# Patient Record
Sex: MALE | Race: BLACK OR AFRICAN AMERICAN | NOT HISPANIC OR LATINO | Employment: UNEMPLOYED | URBAN - METROPOLITAN AREA
[De-identification: names, ages, dates, MRNs, and addresses within clinical notes are randomized per-mention and may not be internally consistent; named-entity substitution may affect disease eponyms.]

---

## 2022-10-05 ENCOUNTER — HOSPITAL ENCOUNTER (EMERGENCY)
Facility: CLINIC | Age: 37
Discharge: HOME | End: 2022-10-05
Attending: EMERGENCY MEDICINE | Admitting: EMERGENCY MEDICINE
Payer: COMMERCIAL

## 2022-10-05 VITALS
SYSTOLIC BLOOD PRESSURE: 132 MMHG | HEIGHT: 68 IN | HEART RATE: 71 BPM | DIASTOLIC BLOOD PRESSURE: 81 MMHG | BODY MASS INDEX: 27.74 KG/M2 | RESPIRATION RATE: 16 BRPM | OXYGEN SATURATION: 96 % | WEIGHT: 183 LBS | TEMPERATURE: 98.7 F

## 2022-10-05 DIAGNOSIS — F19.10 POLYSUBSTANCE ABUSE (H): ICD-10-CM

## 2022-10-05 LAB
ALCOHOL BREATH TEST: 0 (ref 0–0.01)
AMPHETAMINES UR QL SCN: ABNORMAL
BARBITURATES UR QL: ABNORMAL
BENZODIAZ UR QL: ABNORMAL
CANNABINOIDS UR QL SCN: ABNORMAL
COCAINE UR QL: ABNORMAL
OPIATES UR QL SCN: ABNORMAL

## 2022-10-05 PROCEDURE — 99283 EMERGENCY DEPT VISIT LOW MDM: CPT

## 2022-10-05 PROCEDURE — 80307 DRUG TEST PRSMV CHEM ANLYZR: CPT | Performed by: EMERGENCY MEDICINE

## 2022-10-05 PROCEDURE — 99282 EMERGENCY DEPT VISIT SF MDM: CPT | Performed by: EMERGENCY MEDICINE

## 2022-10-05 PROCEDURE — 82075 ASSAY OF BREATH ETHANOL: CPT

## 2022-10-05 RX ORDER — OLANZAPINE 5 MG/1
5 TABLET ORAL
COMMUNITY
Start: 2022-10-05

## 2022-10-05 ASSESSMENT — ENCOUNTER SYMPTOMS
BACK PAIN: 0
DYSPHORIC MOOD: 0
HEADACHES: 0
FEVER: 0
EYE REDNESS: 0
SLEEP DISTURBANCE: 0
ABDOMINAL PAIN: 0
COUGH: 0
SHORTNESS OF BREATH: 0
NECK PAIN: 0
DIFFICULTY URINATING: 0
NAUSEA: 0
WEAKNESS: 0
VOMITING: 0
SORE THROAT: 0

## 2022-10-06 NOTE — ED PROVIDER NOTES
ED Provider Note  [facility]      History     Chief Complaint   Patient presents with     Addiction Problem     HPI  Dolores Serrato is a 37 year old male who presents to the emergency department for evaluation of substance use.  The patient states that he has been smoking methamphetamines, marijuana, and fentanyl.  Patient states that for the past 2 weeks, he has been using methamphetamines and marijuana heavily.  For the past 4 days, he also tried smoking fentanyl.  He denies any withdrawal symptoms when he does not use fentanyl.  The patient states that he occasionally drinks alcohol but does not drink alcohol on a daily basis.  He denies any tremulousness or history of alcohol withdrawal seizures or DTs.  Patient denies any fall or injury.  He denies any recent illness or medical concerns.  He was recently prescribed Zyprexa.  He denies any depression or suicidal ideation.    Past Medical History  Past Medical History:   Diagnosis Date     Bipolar disorder (H)      Depressive disorder      Explosive personality disorder (H)      PTSD (post-traumatic stress disorder)      Schizophrenia (H)      Substance abuse (H)      History reviewed. No pertinent surgical history.  OLANZapine (ZYPREXA) 5 MG tablet      No Known Allergies  Family History  History reviewed. No pertinent family history.  Social History   Social History     Tobacco Use     Smoking status: Current Every Day Smoker     Packs/day: 0.50     Types: Cigarettes, Vaping Device     Smokeless tobacco: Never Used     Tobacco comment: Vapes   Substance Use Topics     Alcohol use: Yes     Comment: Last drank 3 days ago.  Binge drinks     Drug use: Yes     Types: Marijuana, Methamphetamines, Opiates     Comment: Last used yesterday.  Uses 1 pill fentanyl per day.  Meth last 6 weeks regularly      Past medical history, past surgical history, medications, allergies, family history, and social history were reviewed with the patient.  "No additional pertinent items.       Review of Systems   Constitutional: Negative for fever.   HENT: Negative for congestion and sore throat.    Eyes: Negative for redness.   Respiratory: Negative for cough and shortness of breath.    Cardiovascular: Negative for chest pain.   Gastrointestinal: Negative for abdominal pain, nausea and vomiting.   Genitourinary: Negative for difficulty urinating.   Musculoskeletal: Negative for back pain and neck pain.   Skin: Negative for rash.   Neurological: Negative for weakness and headaches.   Psychiatric/Behavioral: Negative for dysphoric mood, sleep disturbance and suicidal ideas.   All other systems reviewed and are negative.    A complete review of systems was performed with pertinent positives and negatives noted in the HPI, and all other systems negative.    Physical Exam   BP: 134/74  Pulse: 69  Temp: 98.7  F (37.1  C)  Resp: 16  Height: 172.7 cm (5' 8\")  Weight: 83 kg (183 lb)  SpO2: 96 %  Physical Exam  Vitals and nursing note reviewed.   Constitutional:       General: He is not in acute distress.     Appearance: He is not diaphoretic.   HENT:      Head: Normocephalic and atraumatic.   Eyes:      General: No scleral icterus.     Pupils: Pupils are equal, round, and reactive to light.   Cardiovascular:      Rate and Rhythm: Normal rate and regular rhythm.      Pulses: Normal pulses.      Heart sounds: Normal heart sounds.   Pulmonary:      Effort: Pulmonary effort is normal. No respiratory distress.      Breath sounds: Normal breath sounds.   Abdominal:      General: Bowel sounds are normal.      Palpations: Abdomen is soft.      Tenderness: There is no abdominal tenderness.   Musculoskeletal:         General: No tenderness. Normal range of motion.   Skin:     General: Skin is warm and dry.      Findings: No rash.   Neurological:      General: No focal deficit present.      Mental Status: He is alert.      Cranial Nerves: No cranial nerve deficit.      Motor: No " weakness.      Coordination: Coordination normal.      Gait: Gait normal.   Psychiatric:         Mood and Affect: Mood normal.         Behavior: Behavior normal.         ED Course      Procedures       The medical record was reviewed and interpreted.  Current labs reviewed and interpreted.              Results for orders placed or performed during the hospital encounter of 10/05/22   Drug abuse screen 1 urine (ED)     Status: Abnormal   Result Value Ref Range    Amphetamines Urine Screen Negative Screen Negative    Barbiturates Urine Screen Negative Screen Negative    Benzodiazepines Urine Screen Negative Screen Negative    Cannabinoids Urine Screen Positive (A) Screen Negative    Cocaine Urine Screen Negative Screen Negative    Opiates Urine Screen Negative Screen Negative   Alcohol breath test POCT     Status: Normal   Result Value Ref Range    Alcohol Breath Test 0.000 0.00 - 0.01   Urine Drugs of Abuse Screen     Status: Abnormal    Narrative    The following orders were created for panel order Urine Drugs of Abuse Screen.  Procedure                               Abnormality         Status                     ---------                               -----------         ------                     Drug abuse screen 1 urin...[[id number]]  Abnormal            Final result                 Please view results for these tests on the individual orders.     Medications - No data to display     Assessments & Plan (with Medical Decision Making)   37 year old male to the emergency department seeking assessment for polysubstance use including primarily methamphetamines and marijuana but also occasional fentanyl.  He smokes all of his drugs.  He denies any IV drug use.  He does not have any medical concerns.  The patient was provided resources for rule 25 assessment for chemical dependency evaluation and treatment.    I have reviewed the nursing notes. I have reviewed the findings, diagnosis, plan and need for follow up with  the patient.    New Prescriptions    No medications on file       Final diagnoses:   Polysubstance abuse (H)     Chart documentation was completed with Dragon voice-recognition software. Even though reviewed, this chart may still contain some grammatical, spelling, and word errors.     --  Anthony Tobar Md  [facility] EMERGENCY DEPARTMENT  10/5/2022     Anthony Tobar MD  10/05/22 2152

## 2022-10-06 NOTE — DISCHARGE INSTRUCTIONS
Follow-up for outpatient chemical dependency assessment and treatment.    Return to the emergency department if any concerns.

## 2022-10-06 NOTE — ED TRIAGE NOTES
Pt. here seeking detox from alcohol, meth, marijuana,and Fentanyl.  Last used alcohol 3 days ago.  Last used meth, marijuana and fentanyl yesterday.  No hx.  withdrawal seizures.  No SI or HI.      Triage Assessment     Row Name 10/05/22 2052       Triage Assessment (Adult)    Airway WDL WDL       Respiratory WDL    Respiratory WDL WDL       Skin Circulation/Temperature WDL    Skin Circulation/Temperature WDL WDL       Cardiac WDL    Cardiac WDL WDL       Peripheral/Neurovascular WDL    Peripheral Neurovascular WDL WDL       Cognitive/Neuro/Behavioral WDL    Cognitive/Neuro/Behavioral WDL WDL

## 2023-03-15 ENCOUNTER — HOSPITAL ENCOUNTER (OUTPATIENT)
Facility: CLINIC | Age: 38
Setting detail: OBSERVATION
Discharge: NURSING, REHAB OR LONG-TERM CARE | End: 2023-03-16
Attending: EMERGENCY MEDICINE | Admitting: NURSE PRACTITIONER
Payer: COMMERCIAL

## 2023-03-15 VITALS
BODY MASS INDEX: 26.83 KG/M2 | WEIGHT: 177 LBS | HEART RATE: 82 BPM | HEIGHT: 68 IN | DIASTOLIC BLOOD PRESSURE: 103 MMHG | SYSTOLIC BLOOD PRESSURE: 159 MMHG | OXYGEN SATURATION: 96 % | RESPIRATION RATE: 16 BRPM | TEMPERATURE: 98.6 F

## 2023-03-15 DIAGNOSIS — R45.850 HOMICIDAL IDEATION: ICD-10-CM

## 2023-03-15 DIAGNOSIS — F20.9 SCHIZOPHRENIA, UNSPECIFIED TYPE (H): ICD-10-CM

## 2023-03-15 DIAGNOSIS — F15.21 METHAMPHETAMINE USE DISORDER, MODERATE, IN EARLY REMISSION (H): ICD-10-CM

## 2023-03-15 DIAGNOSIS — R45.851 SUICIDAL IDEATION: ICD-10-CM

## 2023-03-15 DIAGNOSIS — F10.21 ALCOHOL USE DISORDER, MODERATE, IN EARLY REMISSION (H): ICD-10-CM

## 2023-03-15 DIAGNOSIS — F12.21: ICD-10-CM

## 2023-03-15 LAB
FLUAV RNA SPEC QL NAA+PROBE: NEGATIVE
FLUBV RNA RESP QL NAA+PROBE: NEGATIVE
RSV RNA SPEC NAA+PROBE: NEGATIVE
SARS-COV-2 RNA RESP QL NAA+PROBE: NEGATIVE

## 2023-03-15 PROCEDURE — 90791 PSYCH DIAGNOSTIC EVALUATION: CPT

## 2023-03-15 PROCEDURE — 99285 EMERGENCY DEPT VISIT HI MDM: CPT | Mod: 25

## 2023-03-15 PROCEDURE — 250N000013 HC RX MED GY IP 250 OP 250 PS 637: Performed by: NURSE PRACTITIONER

## 2023-03-15 PROCEDURE — G0378 HOSPITAL OBSERVATION PER HR: HCPCS

## 2023-03-15 PROCEDURE — 87637 SARSCOV2&INF A&B&RSV AMP PRB: CPT | Performed by: EMERGENCY MEDICINE

## 2023-03-15 PROCEDURE — C9803 HOPD COVID-19 SPEC COLLECT: HCPCS

## 2023-03-15 RX ORDER — ACETAMINOPHEN 500 MG
1000 TABLET ORAL EVERY 8 HOURS PRN
Status: DISCONTINUED | OUTPATIENT
Start: 2023-03-15 | End: 2023-03-16 | Stop reason: HOSPADM

## 2023-03-15 RX ORDER — DIPHENHYDRAMINE HCL 25 MG
25-50 CAPSULE ORAL
Status: DISCONTINUED | OUTPATIENT
Start: 2023-03-15 | End: 2023-03-16 | Stop reason: HOSPADM

## 2023-03-15 RX ORDER — OLANZAPINE 5 MG/1
5-10 TABLET ORAL EVERY 6 HOURS PRN
Status: DISCONTINUED | OUTPATIENT
Start: 2023-03-15 | End: 2023-03-16

## 2023-03-15 RX ORDER — QUETIAPINE FUMARATE 100 MG/1
100 TABLET, FILM COATED ORAL AT BEDTIME
Status: DISCONTINUED | OUTPATIENT
Start: 2023-03-15 | End: 2023-03-16 | Stop reason: HOSPADM

## 2023-03-15 RX ADMIN — DIPHENHYDRAMINE HYDROCHLORIDE 50 MG: 25 CAPSULE ORAL at 22:56

## 2023-03-15 RX ADMIN — QUETIAPINE FUMARATE 100 MG: 100 TABLET ORAL at 22:56

## 2023-03-15 RX ADMIN — ACETAMINOPHEN 1000 MG: 500 TABLET ORAL at 20:03

## 2023-03-15 ASSESSMENT — COLUMBIA-SUICIDE SEVERITY RATING SCALE - C-SSRS
2. HAVE YOU ACTUALLY HAD ANY THOUGHTS OF KILLING YOURSELF?: YES
TOTAL  NUMBER OF ACTUAL ATTEMPTS LIFETIME: 1
2. HAVE YOU ACTUALLY HAD ANY THOUGHTS OF KILLING YOURSELF?: YES
1. IN THE PAST MONTH, HAVE YOU WISHED YOU WERE DEAD OR WISHED YOU COULD GO TO SLEEP AND NOT WAKE UP?: YES
5. HAVE YOU STARTED TO WORK OUT OR WORKED OUT THE DETAILS OF HOW TO KILL YOURSELF? DO YOU INTEND TO CARRY OUT THIS PLAN?: NO
ATTEMPT PAST THREE MONTHS: NO
ATTEMPT LIFETIME: YES
4. HAVE YOU HAD THESE THOUGHTS AND HAD SOME INTENTION OF ACTING ON THEM?: NO
5. HAVE YOU STARTED TO WORK OUT OR WORKED OUT THE DETAILS OF HOW TO KILL YOURSELF? DO YOU INTEND TO CARRY OUT THIS PLAN?: YES
3. HAVE YOU BEEN THINKING ABOUT HOW YOU MIGHT KILL YOURSELF?: YES
1. HAVE YOU WISHED YOU WERE DEAD OR WISHED YOU COULD GO TO SLEEP AND NOT WAKE UP?: YES
4. HAVE YOU HAD THESE THOUGHTS AND HAD SOME INTENTION OF ACTING ON THEM?: YES

## 2023-03-15 ASSESSMENT — ACTIVITIES OF DAILY LIVING (ADL)
ADLS_ACUITY_SCORE: 35

## 2023-03-15 NOTE — ED PROVIDER NOTES
"    History     Chief Complaint:  Psychiatric Evaluation    The history is provided by the patient.      Bijal Serrato is a 37 year old male with a history of schizophrenia, bipolar disorder, and PTSD who presents with psychiatric evaluation. There was a recent mistake made with his mediations, so he is now having suicidal and homicidal ideations. He does not have a specific plan for his suicidal ideations, but has been \"exploding\" at his roommate.     Independent Historian: None     Review of External Notes: None available    ROS:  Review of Systems   Psychiatric/Behavioral: Positive for suicidal ideas.        Positive for homicidal ideations   All other systems reviewed and are negative.    Allergies:  No Known Allergies     Medications:    Unspecified psychiatric medications     Past Medical History:    Schizophrenia   Bipolar disorder   PTSD     Social History:  PCP: No primary care provider on file.   The patient presents to the ED alone via private vehicle     Physical Exam     Patient Vitals for the past 24 hrs:   BP Temp Temp src Pulse Resp SpO2 Height Weight   03/15/23 1525 (!) 162/92 98.6  F (37  C) Temporal 88 16 97 % 1.727 m (5' 8\") 84.8 kg (187 lb)        Physical Exam  Constitutional: Alert, attentive, GCS 15   Eyes: EOM are normal, anicteric, conjugate gaze  CV: distal extremities warm, well perfused  Chest: Non-labored breathing on RA  Neurological: Alert, attentive, moving all extremities equally.   Skin: Skin is warm and dry.  Psychological: Passive suicidal and homicidal ideations.     Emergency Department Course   Laboratory:  Labs Ordered and Resulted from Time of ED Arrival to Time of ED Departure   INFLUENZA A/B, RSV, & SARS-COV2 PCR - Normal       Result Value    Influenza A PCR Negative      Influenza B PCR Negative      RSV PCR Negative      SARS CoV2 PCR Negative       Emergency Department Course & Assessments:  Interventions:  None     Independent Interpretation (X-rays, CTs, rhythm " strip):  None      Consultations/Discussion of Management or Tests:  None     Social Determinants of Health affecting care:  None      Assessments:  1530 I obtained history and examined the patient as noted above. [facility]    Disposition:  The patient was transferred to [facility].     Impression & Plan    Medical Decision Makin-year-old male past medical history seen for schizophrenia presenting from what sounds like recent placement of new [facility] for increased homicidal and suicidal ideation.  He has no specific plan or intent and was having more passive thoughts towards residents there.  He contracts for safety here, denies drugs or alcohol, COVID testing was negative and he was sent to [facility] for further evaluation.    Diagnosis:    ICD-10-CM    1. Suicidal ideation  R45.851       2. Homicidal ideation  R45.850       3. Schizophrenia, unspecified type (H)  F20.9          Bhavin Goins MD  [employer]  9:45 PM 03/15/23     Scribe Disclosure:  I, Preet Sun, am serving as a scribe at 3:38 PM on 3/15/2023 to document services personally performed by Bhavin Goins MD based on my observations and the provider's statements to me.    3/15/2023   Bhavin Goins MD Dunbar, John Forrest, MD  03/15/23 4191

## 2023-03-16 PROCEDURE — 99235 HOSP IP/OBS SAME DATE MOD 70: CPT | Performed by: PSYCHIATRY & NEUROLOGY

## 2023-03-16 PROCEDURE — G0378 HOSPITAL OBSERVATION PER HR: HCPCS

## 2023-03-16 RX ORDER — QUETIAPINE FUMARATE 100 MG/1
100 TABLET, FILM COATED ORAL AT BEDTIME
Qty: 14 TABLET | Refills: 0 | Status: SHIPPED | OUTPATIENT
Start: 2023-03-16

## 2023-03-16 RX ORDER — QUETIAPINE FUMARATE 25 MG/1
25 TABLET, FILM COATED ORAL 3 TIMES DAILY PRN
Status: DISCONTINUED | OUTPATIENT
Start: 2023-03-16 | End: 2023-03-16 | Stop reason: HOSPADM

## 2023-03-16 ASSESSMENT — ACTIVITIES OF DAILY LIVING (ADL)
ADLS_ACUITY_SCORE: 35

## 2023-03-16 ASSESSMENT — COLUMBIA-SUICIDE SEVERITY RATING SCALE - C-SSRS
6. HAVE YOU EVER DONE ANYTHING, STARTED TO DO ANYTHING, OR PREPARED TO DO ANYTHING TO END YOUR LIFE?: NO
5. HAVE YOU STARTED TO WORK OUT OR WORKED OUT THE DETAILS OF HOW TO KILL YOURSELF? DO YOU INTEND TO CARRY OUT THIS PLAN?: NO
ATTEMPT SINCE LAST CONTACT: NO
SUICIDE, SINCE LAST CONTACT: NO
1. SINCE LAST CONTACT, HAVE YOU WISHED YOU WERE DEAD OR WISHED YOU COULD GO TO SLEEP AND NOT WAKE UP?: YES
REASONS FOR IDEATION SINCE LAST CONTACT: COMPLETELY TO GET ATTENTION, REVENGE, OR A REACTION FROM OTHERS
2. HAVE YOU ACTUALLY HAD ANY THOUGHTS OF KILLING YOURSELF?: YES
TOTAL  NUMBER OF INTERRUPTED ATTEMPTS SINCE LAST CONTACT: NO
TOTAL  NUMBER OF ABORTED OR SELF INTERRUPTED ATTEMPTS SINCE LAST CONTACT: NO

## 2023-03-16 NOTE — ED NOTES
Patients is currently resting in sensory room C. He reports having some racing thoughts and endorsed SI /HI earlier at the treatment center. He said he feels helpless at this time since he is not sure about what will happen with his housing situation. He made an attempt to reach out to his  from the community and said hopes to get some support with his recovery. Patient was compliant with HS medications. Nursing will continue to monitor for safety.

## 2023-03-16 NOTE — CONSULTS
Diagnostic Evaluation Consultation  Crisis Assessment    Patient was assessed: In Person  Patient location: [facility]  Was a release of information signed: No      Referral Data and Chief Complaint  Dolores is a 37 year old, who uses he/him pronouns, and presents to the ED other: cab from [facility]; they discharged Pt and sent him to 'check himself into the hospital'. Patient is referred to the ED by community provider(s). Patient is presenting to the ED for the following concerns: SI, HI, psychosis, and medication issues.      Informed Consent and Assessment Methods     Patient is his own guardian. Writer met with patient and explained the crisis assessment process, including applicable information disclosures and limits to confidentiality, assessed understanding of the process, and obtained consent to proceed with the assessment. Patient was observed to be able to participate in the assessment as evidenced by verbal engagement. Assessment methods included conducting a formal interview with patient, review of medical records, collaboration with medical staff, and obtaining relevant collateral information from family and community providers when available..     Over the course of this crisis assessment provided reassurance, offered validation and provided psychoeducation. Patient's response to interventions was moderate; Pt visibly upset and agitated by situation that led to coming to ED; Pt was also visibly agitated as he continued to endorse struggling with active psychosis, HI, and SI, but denies intent to harm self or others.     Summary of Patient Situation  Pt was sent to the emergency department today by staff at [facility], via cab, and told they were discharging him and he was to 'check himself into the hospital,' and disclose his HI, SI, and psychosis. Pt describes significant difficulties since arriving at [facility] on 3/8/23, noting the first night he arrived he was not able  to take his medications because they were left at his previous treatment program ([facility]). Pt reports significant mental health and chemical health history, including past diagnoses of Schizophrenia, Bipolar, PTSD, and Intermittent Explosive Disorder. Review of medical records also reveals past diagnosis of Antisocial Personality Disorder, and Schizoaffective Disorder. Pt is currently 36 days sober, which is the longest period of sobriety he's obtained. Pt states he has struggled with methamphetamine, fentanyl, marijuana, and occasionally alcohol use.    Pt states since the first night at [facility], following his inability to have his medications, he has been struggling with being unable to sleep, and experiencing significant increase in AH, VH, paranoia, and psychosis. Pt reports he has a yeast infection that would resolve, and he believes some of psychotropic medications are contributing, and therefor is now hesitant to restart his medications other than Seroquel. Pt does display moderate insight in recognizing some of his paranoid beliefs and AH/VH are symptoms of psychosis, but he is having significant difficulty managing these symptoms, resulting in increased agitation, aggression, and behavioral outbursts. Pt notes he did threaten his roommate, noting he believed the roommate was snoring on purpose 'to fuck with my mental health;' Pt also states the roommate would turn off the heater, so he would have more difficulty sleeping. Pt confronted his roommate by attempting to flip his bed, while roommate was on it, and made threats towards him.     Pt states repeatedly he has been 'losing my fucking mind' and expressed frustration as he hasn't been able to get help while at [facility]. Pt shares multiple examples of AH and VH throughout assessment, including hearing babies screaming coming from his roommates belly; seeing beasts in the wilderness; hearing people calling his name that weren't in the room. Pt  also describes listening to 'cerebral music on Youtube that balances the left and bottom cortex; I'm doing research on it and it helps me reach my euphoria.' Pt also describes having conversations with 'my precious, and my precious has been sad, asking me what are they doing to us;' Pt is referring to his belief his psychotropic medications are causing the yeast infection and impotence.    Brief Psychosocial History  Pt has most recently been living at [facility] in [place]; he was transferred to this program on 3/8/23 from [facility]. Pt is currently working on maintaining sobriety; he is not currently working. Pt denies  status; Pt denies any current legal issues. Pt states he 'has no one' and does not share about family, other than a cousin he no longer trusts. Pt states he is part [place], but declines any efforts to help him connect with [facility] resources, stating 'no, I didn't like how they tried to make me feel;' Pt does not elaborate. Pt does share he felt connected to a Homero from a previous program ([facility]).    Significant Clinical History  Pt has significant mental health and substance use history. Pt reports his MH diagnoses include Schizophrenia, Bipolar, PTSD, and Intermittent Explosive Disorder; EMR also reveals past diagnosis of Antisocial Personality Disorder, as well as Schizoaffective Disorder. Pt reports he started using drugs around 15, and his current period of sobriety is the longest period he has had. Pt historically has struggled with abusing methamphetamine, fentanyl, marijuana, and alcohol.    Review of medical records reveals Pt has multiple ED visits for mental health and substance abuse, including 5 ED (10/5, 10/4, 9/25, 6/25) visits and one inpatient stay (5/17-5/23/22) in 2022. Pt was unable to provide more specific details about his mental health and substance abuse treatment history as he started becoming tearful and agitated during the assessment.       Collateral Information  Clinician attempted to contact staff at [facility] in [place] ([phone]), but there was no answer. Clinician left generic message and requested a call back to discuss the situation.    Risk Assessment  [place] Suicide Severity Rating Scale Full Clinical Version: 3/15/23  Suicidal Ideation  1. Wish to be Dead (Lifetime): Yes  Wish to be Dead Description (Lifetime): Pt states his baseline is constant SI, passive, and denies any current/recent active SI  1. Wish to be Dead (Past 1 Month): Yes  Wish to be Dead Description (Past 1 Month): Pt states his baseline is constant SI, passive, and denies any current/recent active SI  2. Non-Specific Active Suicidal Thoughts (Lifetime): Yes  Non-Specific Active Suicidal Thought Description (Lifetime): Pt states his baseline is constant SI, passive, and denies any current/recent active SI  2. Non-Specific Active Suicidal Thoughts (Past 1 Month): Yes  Non-Specific Active Suicidal Thought Description (Past 1 Month): Pt states his baseline is constant SI, passive, and denies any current/recent active SI  3. Active Suicidal Ideation with any Methods (Not Plan) Without Intent to Act (Lifetime): Yes  3. Active Suicidal Ideation with any Methods (Not Plan) Without Intent to Act (Past 1 Month): No  4. Active Suicidal Ideation with Some Intent to Act, Without Specific Plan (Lifetime): Yes  4. Active Suicidal Ideation with Some Intent to Act, Without Specific Plan (Past 1 Month): No  5. Active Suicidal Ideation with Specific Plan and Intent (Lifetime): Yes  Active Suicidal Ideation with Specific Plan and Intent Description (Lifetime): Pt reports attempted hanging about 1 year ago  5. Active Suicidal Ideation with Specific Plan and Intent (Past 1 Month): No     Suicidal Behavior  Actual Attempt (Lifetime): Yes  Total Number of Actual Attempts (Lifetime): 1 - reports attempted hanging in 2022  Actual Attempt (Past 3 Months): No  C-SSRS Risk  (Lifetime/Recent)  Calculated C-SSRS Risk Score (Lifetime/Recent): Moderate Risk       Validity of evaluation is impacted by presenting factors during interview: while Pt endorsed a baseline of SI and HI, he was unable to provide specific details to answer the C-SSRS fully.   Comments regarding subjective versus objective responses to F[place]ardo tool: NA  Environmental or Psychosocial Events: loss of status/respect/rank, impulsivity/recklessness, unemployment/underemployment, unstable housing, homelessness, other life stressors, neither working nor attending school and other: Pt unexpectedly discharged from CD treatment program today without resources  Chronic Risk Factors: history of suicide attempts (at least one), history of psychiatric hospitalization, chronic and ongoing sleep difficulties and history of abuse or neglect   Warning Signs: seeking access to means to hurt or kill self, hopelessness, rage, anger, seeking revenge, acting reckless or engaging in risky activities, feeling trapped, like there is no way out, anxiety, agitation, unable to sleep, sleeping all the time, dramatic changes in mood and engaging in self-destructive behavior  Protective Factors: other identified factors which may mitigate risk for suicide: Pt reports he is trying to 'heal' and work on his sobriety and   Interpretation of Risk Scoring, Risk Mitigation Interventions and Safety Plan:  Pt to remain at [facility] for observation; Pt moved into a sensory room to reduce potential agitation from peers and noises as he continues to struggle with poor sleep and paranoia.    Does the patient have thoughts of harming others? Yes; Pt endorses chronic, persistent HI and lengthily history of making threats to others. Pt notes this is his baseline and denies any current HI towards any specific person.      Is the patient engaging in sexually inappropriate behavior?  no        Current Substance Abuse     Is there recent substance abuse? no     Was a  urine drug screen or blood alcohol level obtained: No       Mental Status Exam   Affect: Dramatic   Appearance: Appropriate    Attention Span/Concentration: Attentive  Eye Contact: Intense and Variable   Fund of Knowledge: Appropriate    Language /Speech Content: Fluent   Language /Speech Volume: Normal    Language /Speech Rate/Productions: Normal    Recent Memory: Variable   Remote Memory: Variable   Mood: Anxious and Irritable    Orientation to Person: Yes    Orientation to Place: Yes   Orientation to Time of Day: Yes    Orientation to Date: Yes    Situation (Do they understand why they are here?): Answer: Pt was sent by his tx program, and did not understand why    Psychomotor Behavior: Agitated    Thought Content: Hallucinations, Homicidal, Paranoia and Suicidal   Thought Form: Intact      History of commitment: No       Medication  Psychotropic medications:   Current Facility-Administered Medications   Medication     acetaminophen (TYLENOL) tablet 1,000 mg     diphenhydrAMINE (BENADRYL) capsule 25-50 mg     OLANZapine (zyPREXA) tablet 5-10 mg     QUEtiapine (SEROquel) tablet 100 mg     No current outpatient medications on file.       Medication changes made in the last two weeks: Pt reports significant medication non-compliance since arriving at [facility] on 3/8/23       Current Care Team    Primary Care Provider: No  Psychiatrist: No  Therapist: No  : No; Pt does have connection Maria Antonia Navarro at [facility] ([phone])     CTSS or ARMHS: No  ACT Team: No  Other: No      Diagnosis    298.9 (F29)  Unspecified Schizophrenia Spectrum       Clinical Summary and Substantiation of Recommendations    Pt presents to ED after he was unexpectedly discharged from his CD treatment program - [facility] in [place]. Pt describes significant increase in psychotic symptoms, as well as increased aggression and threats towards others, as he's not been consistently taking his medications since  3/8/23. Pt is wanting support in stabilizing his medications, as well as being connected with new resources, including possible IRTS program.    Disposition    Recommended disposition: Other: OBS       Reviewed case and recommendations with attending provider. Attending Name: Matthew Brian CNP       Attending concurs with disposition: Yes       Patient and/or validated legal guardian concurs with disposition: Yes       Final disposition: Other: OBS.     Inpatient Details (if applicable):   Is patient admitted voluntarily: NA      Patient aware of potential for transfer if there is not appropriate placement? NA       Patient is willing to travel outside of the [facility] for placement? NA      Behavioral Intake Notified? NA     Outpatient Details (if applicable):   Aftercare plan and appointments placed in the AVS and provided to patient: No. Rationale: Will be provided at discharge    Was lethal means counseling provided as a part of aftercare planning? No;       Assessment Details    Patient interview started at: 817 and completed at: 900.     Total duration spent on the patient case in minutes: 1.75 hrs      CPT code(s) utilized: 53964 - Psychotherapy for Crisis - 60 (30-74*) min and 57760 - Psychotherapy for Crisis (Each additional 30 minutes) - 30 min        TAI CUNHA, MA, LMFT, Psychotherapist  [facility] - Triage & Transition Services  Callback: [phone]

## 2023-03-16 NOTE — ED PROVIDER NOTES
"Em[facility] Unit - Psychiatric Observation Discharge Summary  [facility] Emergency Department  Discharge Date: 3/16/2023  Admit date: 3/15/23    Bijal Serrato MRN: [id number]   Age: 37 year old YOB: 1985     Brief HPI & Initial ED Course     Chief Complaint   Patient presents with     Psychiatric Evaluation     HPI  Bijal Serrato is a 37 year old male with history notable for schizophrenia and substance use disorder in early remission. He is feeling much better after several hours of sleep during the day and some time with the . He is open to going to a crisis bed.  A bed was found at [facility].  He is no longer suicidal at this point.  He does admit to having chronic suicidal thoughts, but denies any right now.  He denies any hallucinations currently.          Physical Examination   BP: (!) 159/103  Pulse: 82  Temp: 98.6  F (37  C)  Resp: 16  Height: 172.7 cm (5' 8\")  Weight: 80.3 kg (177 lb)  SpO2: 96 %    Physical Exam  General: Appears stated age.   Neuro: Alert and fully oriented. Extremities appear to demonstrate normal strength on visual inspection.   Integumentary/Skin: no rash visualized, normal color    Psychiatric Examination   Appearance: awake, alert, adequately groomed and appeared as age stated  Attitude:  cooperative  Eye Contact:  good  Mood:  better  Affect:  mood congruent  Speech:  clear, coherent  Psychomotor Behavior:  no evidence of tardive dyskinesia, dystonia, or tics  Thought Process:  logical, linear and goal oriented  Associations:  no loose associations  Thought Content:  no evidence of suicidal ideation or homicidal ideation and no evidence of psychotic thought  Insight:  fair  Judgement:  fair  Oriented to:  time, person, and place  Attention Span and Concentration:  intact  Recent and Remote Memory:  intact  Language: able to name/identify objects without impairment  Fund of Knowledge: intact with awareness of current and past events    Results        Labs " Ordered and Resulted from Time of ED Arrival to Time of ED Departure   INFLUENZA A/B, RSV, & SARS-COV2 PCR - Normal       Result Value    Influenza A PCR Negative      Influenza B PCR Negative      RSV PCR Negative      SARS CoV2 PCR Negative         Observation Course   The patient was found to have a psychiatric condition that would benefit from an observation stay in the emergency department for further psychiatric stabilization and/or coordination of a safe disposition. The plan upon observation admission included serial assessments of psychiatric condition, potential administration of medications if indicated, further disposition pending the patient's psychiatric course during the monitoring period.     Serial assessments of the patient's psychiatric condition were performed. Nursing notes were reviewed. During the observation period, the patient did not require medications for agitation, and did not require restraints/seclusion for patient and/or provider safety.     After a period of working with the treatment team on the [facility] unit, the patient's mental state improved to allow a safe transition to outpatient care. After counseling on the diagnosis, work-up, and treatment plan, the patient was discharged. Close follow-up with a psychiatrist and/or therapist was recommended and community psychiatric resources were provided. Patient is to return to the ED if any urgent or potentially life-threatening concerns.     Discharge Diagnoses:   Final diagnoses:   Suicidal ideation   Homicidal ideation   Schizophrenia, unspecified type (H)   Alcohol use disorder, moderate, in early remission (H)   Tetrahydrocannabinol (THC) use disorder, moderate, in early remission, dependence (H)   Methamphetamine use disorder, moderate, in early remission (H)       Treatment Plan:  -Continue seroquel 100 mg at bedtime  -discharge to [facility] for further stabilization  -Continue to work on sobriety      At the time of discharge,  the patient's acute suicide risk was determined to be low due to the following factors: Reduction in the intensity of mood/anxiety symptoms that preceded the admission, denial of suicidal thoughts, denies feeling helpless or helpless, not currently under the influence of alcohol or illicit substances, denies experiencing command hallucinations, no immediate access to firearms. The patient's acute risk could be higher if noncompliant with their treatment plan, medications, follow-up appointments or using illicit substances or alcohol. Protective factors include: sobriety, housing, willingness to go to treatment    I spent less than 30 minutes on discharge day activities.    --  Mendez Cruz MD  [facility] EMERGENCY DEPT  Em[facility] Unit  3/16/2023      Mendez Cruz MD  03/16/23 1500       Mendez Cruz MD  03/16/23 1500

## 2023-03-16 NOTE — PLAN OF CARE
Bijal Serrato  March 15, 2023  Plan of Care Hand-off Note     Patient Care Path: Observation    Plan for Care:     Pt presents to ED after he was unexpectedly discharged from his  treatment program - [facility] in [place]. Pt describes significant increase in psychotic symptoms, as well as increased aggression and threats towards others, as he's not been consistently taking his medications since 3/8/23. Pt is wanting support in stabilizing his medications, as well as being connected with new resources, including possible IRTS program.    Critical Safety Issues: Psychosis, SI, HI    Overview:  This patient is a child/adolescent: No    This patient has additional special visitor precautions: No    Legal Status: Voluntary    Contacts:   Maria Antonia Navarro CM from [facility] (former program - not current) - [phone]  [facility] - possibly April or Cynthia for contact - [phone]  Homero ex-counselor at [facility], [phone]    Psychiatry Consult:  Psychiatry Consult not requested because Pt remain on Em[facility]    Updated RN regarding plan of care.    TAI CUNHA

## 2023-03-16 NOTE — ED NOTES
"[facility] Crisis Reassessment      Bijal Serrato was reassessed for the following reasons: after a stay under Observation. Pt was first seen on 3/15/23 by TAI Menard; see the initial assessment note for details.      Patient Presentation    Initial ED presentation details: Pt was sent to the emergency department today by staff at [facility], via cab, and told they were discharging him and he was to 'check himself into the hospital,' and disclose his HI, SI, and psychosis. Pt describes significant difficulties since arriving at [facility] on 3/8/23, noting the first night he arrived he was not able to take his medications because they were left at his previous treatment program ([facility]). Pt reports significant mental health and chemical health history, including past diagnoses of Schizophrenia, Bipolar, PTSD, and Intermittent Explosive Disorder. Review of medical records also reveals past diagnosis of Antisocial Personality Disorder, and Schizoaffective Disorder. Pt is currently 36 days sober, which is the longest period of sobriety he's obtained. Pt states he has struggled with methamphetamine, fentanyl, marijuana, and occasionally alcohol use.    Current patient presentation: Writer met with the patient in the sensory room.  Patient was resting, stated he is still feeling fatigued, he noted he has been having troubles sleeping for the last six days.  Patient stated he is angry with [facility], he feels like they \"just dumped me here\", he said he had spoke to staff about a plan to discharge to an IRTS and ultimately they sent him in a cab to the ED.  Patient spoke about wanting MI/CD treatment, he talked about this being the longest he has gone with sobriety, stated he is motivated to continue but acknowledges he wants more help with his mental health.  Patient appears tired and presents as irritable.  He states he always has some SI/HI at baseline, today is no different, he denies " having thoughts of specific individuals or of a plan to harm himself.  Discussed crisis residence with the patient, he is familiar and thinks this is the best option for him.  Patient's mood becomes more agreeable and cooperative with writer.  Patient declines scheduling services today, he prefers to call and schedule therapy and psychiatry directly with [facility].    Changes observed since initial assessment:  Patient's intensity in mood has improved.  Patient notes SI/HI at baseline, he does not identify thoughts of a plan, intent, or means.  He denies having specific individuals in mind regarding HI, denies command hallucinations.        Risk of Harm    [place] Suicide Severity Rating Scale Full Clinical Version: 3/15/23  Suicidal Ideation  1. Wish to be Dead (Lifetime): Yes  Wish to be Dead Description (Lifetime): Pt states his baseline is constant SI, passive, and denies any current/recent active SI  1. Wish to be Dead (Past 1 Month): Yes  Wish to be Dead Description (Past 1 Month): Pt states his baseline is constant SI, passive, and denies any current/recent active SI  2. Non-Specific Active Suicidal Thoughts (Lifetime): Yes  Non-Specific Active Suicidal Thought Description (Lifetime): Pt states his baseline is constant SI, passive, and denies any current/recent active SI  2. Non-Specific Active Suicidal Thoughts (Past 1 Month): Yes  Non-Specific Active Suicidal Thought Description (Past 1 Month): Pt states his baseline is constant SI, passive, and denies any current/recent active SI  3. Active Suicidal Ideation with any Methods (Not Plan) Without Intent to Act (Lifetime): Yes  3. Active Suicidal Ideation with any Methods (Not Plan) Without Intent to Act (Past 1 Month): No  4. Active Suicidal Ideation with Some Intent to Act, Without Specific Plan (Lifetime): Yes  4. Active Suicidal Ideation with Some Intent to Act, Without Specific Plan (Past 1 Month): No  5. Active Suicidal Ideation  with Specific Plan and Intent (Lifetime): Yes  Active Suicidal Ideation with Specific Plan and Intent Description (Lifetime): Pt reports attempted hanging about 1 year ago  5. Active Suicidal Ideation with Specific Plan and Intent (Past 1 Month): No     Suicidal Behavior  Actual Attempt (Lifetime): Yes  Total Number of Actual Attempts (Lifetime): 1  Actual Attempt (Past 3 Months): No  C-SSRS Risk (Lifetime/Recent)  Calculated C-SSRS Risk Score (Lifetime/Recent): Moderate Risk    [place] Suicide Severity Rating Scale Since Last Contact: 3/16/23  Suicidal Ideation (Since Last Contact)  1. Wish to be Dead (Since Last Contact): Yes  2. Non-Specific Active Suicidal Thoughts (Since Last Contact): Yes  3. Active Suicidal Ideation with any Methods (Not Plan) Without Intent to Act (Since Last Contact): No  4. Active Suicidal Ideation with Some Intent to Act, Without Specific Plan (Since Last Contact): No  5. Active Suicidal Ideation with Specific Plan and Intent (Since Last Contact): No  Suicidal Behavior (Since Last Contact)  Actual Attempt (Since Last Contact): No  Has subject engaged in non-suicidal self-injurious behavior? (Since Last Contact): No  Interrupted Attempts (Since Last Contact): No  Aborted or Self-Interrupted Attempt (Since Last Contact): No  Preparatory Acts or Behavior (Since Last Contact): No  Suicide (Since Last Contact): No     C-SSRS Risk (Since Last Contact)  Calculated C-SSRS Risk Score (Since Last Contact): Low Risk    Validity of evaluation is not impacted by presenting factors during interview, patient reports SI/HI at baseline.   Comments regarding subjective versus objective responses to [place] tool: NA  Environmental or Psychosocial Events: impulsivity/recklessness, unemployment/underemployment, unstable housing, homelessness and other: Pt unexpectedly discharged from CD treatment program today without resources  Chronic Risk Factors: history of suicide attempts (1), history of psychiatric  hospitalization, chronic and ongoing sleep difficulties, parent divorce and serious, persistent mental illness   Warning Signs: seeking access to means to hurt or kill self, hopelessness, rage, anger, seeking revenge, feeling trapped, like there is no way out, anxiety, agitation, unable to sleep, sleeping all the time and recent losses (physical, financial, personal)  Protective Factors: good treatment engagement and reality testing ability  Interpretation of Risk Scoring, Risk Mitigation Interventions and Safety Plan:  Patient's intensity in mood has improved.  Patient notes SI/HI at baseline, he does not identify thoughts of a plan, intent, or means.  He denies having specific individuals in mind regarding HI, denies command hallucinations.        Does the patient have thoughts of harming others? Yes, at baseline, he does not identify thoughts of a plan, intent, or means.  He denies having specific individuals in mind regarding HI, denies command hallucinations.      Mental Status Exam   Affect: Blunted and Flat   Appearance: Appropriate    Attention Span/Concentration: Attentive?    Eye Contact: Variable   Fund of Knowledge: Appropriate    Language /Speech Content: Fluent   Language /Speech Volume: Normal    Language /Speech Rate/Productions: Normal    Recent Memory: Intact   Remote Memory: Intact   Mood: Irritable    Orientation to Person: Yes    Orientation to Place: Yes   Orientation to Time of Day: Yes    Orientation to Date: Yes    Situation (Do they understand why they are here?): Yes    Psychomotor Behavior: Normal    Thought Content: Homicidal and Suicidal   Thought Form: Goal Directed and Intact       Additional Collateral Information   Spoke to Natty at [facility] to give clinical information for review.  Patient accepted to crisis residence at [facility].       Therapeutic Intervention  The following therapeutic methodologies were employed when working with the patient: Establishing rapport,  Active listening, Assess dimensions of crisis, Establish a discharge plan, Motivational Interviewing and Safety planning. Patient response to intervention: guarded, patient presents as irritable, though does cooperate.    Diagnosis:      298.9 (F29)  Unspecified Schizophrenia Spectrum     Clinical Substantiation of Recommendations  Patient participated in assessment and psychiatric consult.  Patient denies SI/HI/NSSI today.  Patient appropriately engaged in safety and discharge planning.  Upon reassessment, the patient's acute suicide risk was determined to be low due to the following factors: reduction in the intensity of mood/anxiety symptoms that preceded the admission, denial of suicidal thoughts, denies feeling helpless or hopeless, not currently under the influence of alcohol or illicit substances, denies experiencing command hallucinations and no immediate access to firearms. Protective factors include: social support, voluntarily seeking mental health support, displays resiliency, future focused thinking, displays insight, expresses desire to engage in treatment, sense of obligation to people/pets and safe/stable housing.     Plan:    Disposition  Recommended disposition: Individual Therapy, Medication Management, Substance Abuse Disorder Treatment and Other: [facility]      Reviewed case and recommendations with attending provider. Attending Name: Dr. Cruz     Attending concurs with disposition: Yes      Patient and/or verified legal guardian concurs with disposition: Yes      Final disposition: Individual therapy , Medication management, Substance abuse disorder treatment  and Other: [facility].         Assessment Details  Total duration spent on the patient case in minutes: .50 hrs     CPT code(s) utilized: 97679 - Psychotherapy (with patient) - 45 (38-52*) min       DAMIEN Alaniz, [facility]  Callback: [phone]     Aftercare Plan  If I am feeling unsafe or I  am in a crisis, I will:   Contact my established care providers   Call the National Suicide Prevention Lifeline: 988  Go to the nearest emergency room   Call 911    Patient will discharge to Crisis Residence at 4pm-    [facility]  [address] [place] [address]    Patient prefers to schedule psychiatry and therapy directly with:     [facility]   [address] [place] [address]  To schedule an appointment please call [phone]  Monday - Friday  8:30 AM - 5:00 PM  Clinic: [phone]  Mental health crisis: [phone]     Warning signs that I or other people might notice when a crisis is developing for me:   I am having increasing suicidal thoughts that turn to plans with intent or means, I am having additional urges to self-harm, my emotions are of hopelessness, feeling like there's no way out, rage or anger, engaging in risky activities without thinking, withdrawing from family/friends, dramatic mood swings, drastic personality changes, use of alcohol or drugs, neglect of personal hygiene or cares     Things I am able to do on my own to cope or help me feel better:   Spending quality time with loved ones, Staying hydrated, Eating balanced meals, Going for a walk every day, Take care of daily responsibilities/needs, Focus on positive self-talk vs negative self-talk      Things that I am able to do with others to cope or help me better:   Exercise, listen to music, practice deep breathing, meditations, write in a journal, self-regulate, self check-in, ask for help when needed        Things I can use or do for distraction:   -I will attend scheduled mental health therapy and psychiatric appointments and follow all recommendations       -I will commit to 30 minutes of self care daily - this can be as simple as taking a shower, going for a walk, cooking a meal, reading, writing, watching something funny, cleaning your home, or evening looking  up affirmations.      -I will practice square breathing when I begin to feel anxious - in breath through the nose for the count of 4 and the first line on the square. Out breath through the mouth for the count of 4 for the second line of the square. Repeat to complete the square. Repeat the square as many times as needed.      - I will use distraction skills of: going for walks, watching TV, spending time outside, calling a friend or family member, creating a playlist, write a hand written letter to a loved one, or listening to a pod cast.       -Download a meditation mary beth and spend 15-20 minutes per day mediating/relaxing. Some apps to download include: Calm, Headspace and Insight Timer. All 3 of these apps have free version      Grounding Techniques:       Try to notice where you are, your surroundings including the people, the sounds like the TV or radio.       Concentrate on your breathing. Take a deep cleansing breath from your diaphragm. Count the breaths as you exhale. Make sure you breath slowly.       Hold something that you find comforting, for some it may be a stuffed animal or a blanket. Notice how it feels in your hands. Is it hard or soft?       During a non-crisis time make a list of positive affirmations. Print them out and keep them handy for times of intense anxiety. At those times, read them aloud.      Try the [employer] game:       Name 5 things you can see in the room with you       Name 4 things you can feel ( clothing on your back  or   fan on your skin )        Name 3 things you can hear right now ( people talking ,  birds  or  tv )        Name 2 things you can smell right now (or, 2 things you like the smell of)        Name 1 good thing about yourself      Create A Safe Place       Image a safe place -- it can be a real or imaginary place:        What do you see -- especially colors?        What sounds do you hear?        What sensations do you feel?        What smells do you smell?        What  people or animals would you want in your safe place?        Imagine a protective bubble, wall or boundary around your safe place.        Imagine a door or gate with a guard at your safe place.        Image a lock and key to your safe place and only you can unlock it.       You can draw or make a collage that represents your safe place.        Choose a souvenir of your safe place -- a color, an object, a song.        Keep your image of your safe place so you can come back to it when you need to.      Reduce Extreme Emotion  QUICKLY:  Changing Your Body Chemistry        Change your body Temperature to change your autonomic nervous system        Use Ice Water to calm yourself down FAST        Splash ice water on your face, or hold an ice pack on your face       Intensely exercise to calm down a body revved up by emotion        Examples: running, walking fast, jumping, playing basketball, weight lifting, swimming, calisthenics, etc.        Engage in exercises that DO NOT include violent behaviors. Exercises that utilize violent behaviors tend to function as  behavioral rehearsal,  and rather than calming the person down, may actually  rev  the person up more, increasing the likelihood of violence, and lessening the likelihood that they will  burn off  energy       Progressively relax your muscles        Starting with your hands, moving to your forearms, upper arms, shoulders, neck, forehead, eyes, cheeks and lips, tongue and teeth, chest, upper back, stomach, buttocks, thighs, calves, ankles, feet        Tense (10 seconds,   of the way), then relax each muscle (all the way)        Notice the tension        Notice the difference when relaxed (by tensing first, and then relaxing, you are able to get a more thorough relaxation than by simply relaxing)        Paced breathing to relax        The standard technique is to begin with counting the number of steps one takes for a typical inhale, then counting the steps one takes  "for a typical exhale, and then lengthening the amount of steps for the exhalation by one or two steps.  OR       Repeat this pattern for 1-2 minutes       Inhale for four (4) seconds        Exhale for six (6) to eight (8) seconds        Research demonstrated that one can change one's overall level of anxiety by doing this exercise for even a few minutes per day      Practice Urge Surfing       This is a mindfulness technique that can be used to help reduce impulsive behaviors. Take several big deep breaths and \"ride the wave\" before you act upon negative thoughts or strong reactions.       1. Identify the Physical Sensation in the Body. Stop for a few minutes and be mindful of your physical responses to your urge. You can close your eyes ...  2. Focus on the Sensations. 3. Notice Breathing. 4. Refocus on Your Body. 5 Stay Curious and Present.         Sensations       Squeeze a rubber ball very hard. Listen to very loud music. Hold ice in your hand or mouth. Pay with sensory items. Go out in the rain or snow. Take a hot or cold shower.  Place an ice pack or a warm cloth on your forehead. Remember that you can use your 5 senses as helpful self-soothing techniques.      DBT Skills:     The ABC PLEASE skill is about taking good care of ourselves so that we can take care of others. Also, an important component of DBT is to reduce our vulnerability. When we take good care of ourselves, we are less likely to be vulnerable to disease and emotional crisis.          ABC       A- Accumulate positive emotions by doing things that are pleasant.       B- Build mastery by doing things we enjoy. Whether it is reading, cooking, cleaning, fixing a car, working a cross word puzzle, or playing a musical instrument. Practice these things to  and in time we feel competent.       C- [place] Ahead by rehearsing a plan ahead of time so that we can be prepared to cope skillfully. (Think of what makes situations difficult, and what " helps in those situations)           Changes I can make to support my mental health and wellness:   -Come back to the Emergency Department with any new or worsening symptoms      -Use community resources, including hotline numbers, [facility] crisis and support meetings      -Maintain a daily schedule/routine      -Practice deep breathing skills      -Disclose my urges to people I trust, such as:  Mother, Friends, family      -Abstain from all mood altering chemicals not currently prescribed to me       -Reduce caffeine intake      -Take medications as prescribed; Remove access to large amounts medications in your posession, have a pill ferrer for any prescribed medications or work with home to have medications supplied to you daily to ensure safety.      -Talk with family/ loved ones about your symptoms       -Safety plan in the home, increase observation and check in often with staff. Keep door open and be open to touching base with staff as often as possible      -Remove any unsafe objects in the home like firearms or sharp objects. Discuss with family on going removal as needed to ensure safety     -Follow up with out-patient recommended levels of care that were discussed today     -Remember, start where you are, use what you have and do what you can in regard to coping skills and services.      People in my life that I can ask for help:   -Friends/Family  -Outpatient therapist/psychiatrist  - Staff at Crisis Residence         Your [facility] has a mental health crisis team you can call 24/7:[facility] Crisis Line Number: [phone]  [facility] Crisis Line Number: [phone]     Other things that are important when I'm in crisis:   [place] crisis line @ **CRISIS (**138446) or by texting  [place]  to 715827.       Crisis Intervention: 161.319.6538 or 016-048-9881 (TTY: 365.452.9156). Call anytime for help.      National [place] on Mental Illness (www.[place].bridgette.org): 396.679.9555 or 532-809-2572.       National  "Crisis Hotline; 988            Crisis Lines  Crisis Text Line  Text 079303  You will be connected with a trained live crisis counselor to provide support.     Por espanol, texto  JAX a 841413 o texto a 442-AYUDAME en Whatpp     The [employer] (LGBTQ Youth Crisis Line)  5.769.372.7099  text START to 327-126        Community algrano  Fast Tracker  Linking people to mental health and substance use disorder resources  retsCloud.Meedor      [place] Mental Health Warm Line  Peer to peer support  Monday thru Saturday, 12 pm to 10 pm  057.490.5257 or 8.859.077.2088  Text \"Support\" to 51419     National [place] on Mental Illness (TANNER)  763.532.2889 or 1.888.TANNER.HELPS        Mental Health Apps  My3  https://SmartGrains.org/     VirtualHopeBox  https://[employer]/apps/virtual-hope-box/        Additional Information  Today you were seen by a licensed mental health professional through Triage and Transition services, Behavioral Healthcare Providers ([facility])  for a crisis assessment in the Emergency Department at [facility].  It is recommended that you follow up with your established providers (psychiatrist, mental health therapist, and/or primary care doctor - as relevant) as soon as possible. Coordinators from [facility] will be calling you in the next 24-48 hours to ensure that you have the resources you need.  You can also contact [facility] coordinators directly at [phone]. You may have been scheduled for or offered an appointment with a mental health provider. [facility] maintains an extensive network of licensed behavioral health providers to connect patients with the services they need.  We do not charge providers a fee to participate in our referral network.  We match patients with providers based on a patient's specific needs, insurance coverage, and location.  Our first effort will be to refer you to a provider within your care system, and will utilize providers outside your care system as needed. "

## 2023-03-16 NOTE — DISCHARGE INSTRUCTIONS
Aftercare Plan  Continue current medication seroquel 100 mg at bedtime    If I am feeling unsafe or I am in a crisis, I will:   Contact my established care providers   Call the National Suicide Prevention Lifeline: 988  Go to the nearest emergency room   Call 911    Patient will discharge to Crisis [facility]idence at 4pm-    [facility]  [address] [place] [address]    Patient prefers to schedule psychiatry and therapy directly with:     [facility]   [address] [place] [address]  To schedule an appointment please call [phone]  Monday - Friday  8:30 AM - 5:00 PM  Clinic: [phone]  Mental health crisis: [phone]     Warning signs that I or other people might notice when a crisis is developing for me:   I am having increasing suicidal thoughts that turn to plans with intent or means, I am having additional urges to self-harm, my emotions are of hopelessness, feeling like there's no way out, rage or anger, engaging in risky activities without thinking, withdrawing from family/friends, dramatic mood swings, drastic personality changes, use of alcohol or drugs, neglect of personal hygiene or cares     Things I am able to do on my own to cope or help me feel better:   Spending quality time with loved ones, Staying hydrated, Eating balanced meals, Going for a walk every day, Take care of daily responsibilities/needs, Focus on positive self-talk vs negative self-talk      Things that I am able to do with others to cope or help me better:   Exercise, listen to music, practice deep breathing, meditations, write in a journal, self-regulate, self check-in, ask for help when needed        Things I can use or do for distraction:   -I will attend scheduled mental health therapy and psychiatric appointments and follow all recommendations       -I will commit to 30 minutes of self care daily - this can be as simple as taking a shower, going for a  walk, cooking a meal, reading, writing, watching something funny, cleaning your home, or evening looking up affirmations.      -I will practice square breathing when I begin to feel anxious - in breath through the nose for the count of 4 and the first line on the square. Out breath through the mouth for the count of 4 for the second line of the square. Repeat to complete the square. Repeat the square as many times as needed.      - I will use distraction skills of: going for walks, watching TV, spending time outside, calling a friend or family member, creating a playlist, write a hand written letter to a loved one, or listening to a pod cast.       -Download a meditation mary beth and spend 15-20 minutes per day mediating/relaxing. Some apps to download include: Calm, Headspace and Insight Timer. All 3 of these apps have free version      Grounding Techniques:       Try to notice where you are, your surroundings including the people, the sounds like the TV or radio.       Concentrate on your breathing. Take a deep cleansing breath from your diaphragm. Count the breaths as you exhale. Make sure you breath slowly.       Hold something that you find comforting, for some it may be a stuffed animal or a blanket. Notice how it feels in your hands. Is it hard or soft?       During a non-crisis time make a list of positive affirmations. Print them out and keep them handy for times of intense anxiety. At those times, read them aloud.      Try the [employer] game:       Name 5 things you can see in the room with you       Name 4 things you can feel ( clothing on your back  or   fan on your skin )        Name 3 things you can hear right now ( people talking ,  birds  or  tv )        Name 2 things you can smell right now (or, 2 things you like the smell of)        Name 1 good thing about yourself      Create A Safe Place       Image a safe place -- it can be a real or imaginary place:        What do you see -- especially colors?         What sounds do you hear?        What sensations do you feel?        What smells do you smell?        What people or animals would you want in your safe place?        Imagine a protective bubble, wall or boundary around your safe place.        Imagine a door or gate with a guard at your safe place.        Image a lock and key to your safe place and only you can unlock it.       You can draw or make a collage that represents your safe place.        Choose a souvenir of your safe place -- a color, an object, a song.        Keep your image of your safe place so you can come back to it when you need to.      Reduce Extreme Emotion  QUICKLY:  Changing Your Body Chemistry        Change your body Temperature to change your autonomic nervous system        Use Ice Water to calm yourself down FAST        Splash ice water on your face, or hold an ice pack on your face       Intensely exercise to calm down a body revved up by emotion        Examples: running, walking fast, jumping, playing basketball, weight lifting, swimming, calisthenics, etc.        Engage in exercises that DO NOT include violent behaviors. Exercises that utilize violent behaviors tend to function as  behavioral rehearsal,  and rather than calming the person down, may actually  rev  the person up more, increasing the likelihood of violence, and lessening the likelihood that they will  burn off  energy       Progressively relax your muscles        Starting with your hands, moving to your forearms, upper arms, shoulders, neck, forehead, eyes, cheeks and lips, tongue and teeth, chest, upper back, stomach, buttocks, thighs, calves, ankles, feet        Tense (10 seconds,   of the way), then relax each muscle (all the way)        Notice the tension        Notice the difference when relaxed (by tensing first, and then relaxing, you are able to get a more thorough relaxation than by simply relaxing)        Paced breathing to relax        The standard technique is  "to begin with counting the number of steps one takes for a typical inhale, then counting the steps one takes for a typical exhale, and then lengthening the amount of steps for the exhalation by one or two steps.  OR       Repeat this pattern for 1-2 minutes       Inhale for four (4) seconds        Exhale for six (6) to eight (8) seconds        Research demonstrated that one can change one's overall level of anxiety by doing this exercise for even a few minutes per day      Practice Urge Surfing       This is a mindfulness technique that can be used to help reduce impulsive behaviors. Take several big deep breaths and \"ride the wave\" before you act upon negative thoughts or strong reactions.       1. Identify the Physical Sensation in the Body. Stop for a few minutes and be mindful of your physical responses to your urge. You can close your eyes ...  2. Focus on the Sensations. 3. Notice Breathing. 4. Refocus on Your Body. 5 Stay Curious and Present.         Sensations       Squeeze a rubber ball very hard. Listen to very loud music. Hold ice in your hand or mouth. Pay with sensory items. Go out in the rain or snow. Take a hot or cold shower.  Place an ice pack or a warm cloth on your forehead. Remember that you can use your 5 senses as helpful self-soothing techniques.      DBT Skills:     The ABC PLEASE skill is about taking good care of ourselves so that we can take care of others. Also, an important component of DBT is to reduce our vulnerability. When we take good care of ourselves, we are less likely to be vulnerable to disease and emotional crisis.          ABC       A- Accumulate positive emotions by doing things that are pleasant.       B- Build mastery by doing things we enjoy. Whether it is reading, cooking, cleaning, fixing a car, working a cross word puzzle, or playing a musical instrument. Practice these things to  and in time we feel competent.       C- [place] Ahead by rehearsing a plan ahead " of time so that we can be prepared to cope skillfully. (Think of what makes situations difficult, and what helps in those situations)           Changes I can make to support my mental health and wellness:   -Come back to the Emergency Department with any new or worsening symptoms      -Use community resources, including hotline numbers, [facility] crisis and support meetings      -Maintain a daily schedule/routine      -Practice deep breathing skills      -Disclose my urges to people I trust, such as:  Mother, Friends, family      -Abstain from all mood altering chemicals not currently prescribed to me       -Reduce caffeine intake      -Take medications as prescribed; Remove access to large amounts medications in your posession, have a pill ferrer for any prescribed medications or work with home to have medications supplied to you daily to ensure safety.      -Talk with family/ loved ones about your symptoms       -Safety plan in the home, increase observation and check in often with staff. Keep door open and be open to touching base with staff as often as possible      -Remove any unsafe objects in the home like firearms or sharp objects. Discuss with family on going removal as needed to ensure safety     -Follow up with out-patient recommended levels of care that were discussed today     -Remember, start where you are, use what you have and do what you can in regard to coping skills and services.      People in my life that I can ask for help:   -Friends/Family  -Outpatient therapist/psychiatrist  - Staff at Crisis [facility]idence         Your [facility] has a mental health crisis team you can call 24/7:[facility] Crisis Line Number: [phone]  [facility] Crisis Line Number: [phone]     Other things that are important when I'm in crisis:   [place] crisis line @ **CRISIS (**249673) or by texting  [place]  to 289909.       Crisis Intervention: 916.356.4798 or 491-567-0371 (TTY: 870.462.2294). Call anytime for help.  "     National [place] on Mental Illness (www.[place].org): 346-104-2331 or 215-941-1225.       National Crisis Hotline; 988            Crisis Lines  Crisis Text Line  Text 436977  You will be connected with a trained live crisis counselor to provide support.     Por espanol, texto  JAX a 003795 o texto a 442-AYUDAME en WhatsApp     [employer] (LGBTQ Youth Crisis Line)  4.905.649.8918  text START to 024-765        [employer]  Fast Tracker  Linking people to mental health and substance use disorder resources  [employer].Rethink      [place] Mental Health Warm Line  Peer to peer support  Monday thru Saturday, 12 pm to 10 pm  134.274.1502 or 2.049.377.6378  Text \"Support\" to 21523     National [place] on Mental Illness ([place])  157.583.2554 or 1.888.BR[place]GETTE.HELPS        Mental Health Apps  My3  https://StarBlock.com.org/     VirtualHopeBox  https://Consumer Agent Portal (CAP)/apps/virtual-hope-box/        Additional Information  Today you were seen by a licensed mental health professional through Triage and Transition services, [facility]iders ([facility])  for a crisis assessment in the Emergency Department at [facility].  It is recommended that you follow up with your established providers (psychiatrist, mental health therapist, and/or primary care doctor - as relevant) as soon as possible. Coordinators from [facility] will be calling you in the next 24-48 hours to ensure that you have the resources you need.  You can also contact [facility] coordinators directly at [phone]. You may have been scheduled for or offered an appointment with a mental health provider. [facility] maintains an extensive network of licensed behavioral health providers to connect patients with the services they need.  We do not charge providers a fee to participate in our referral network.  We match patients with providers based on a patient's specific needs, insurance coverage, and location.  Our first effort will be to refer you " to a provider within your care system, and will utilize providers outside your care system as needed.
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
